# Patient Record
Sex: FEMALE | Race: OTHER | NOT HISPANIC OR LATINO | URBAN - METROPOLITAN AREA
[De-identification: names, ages, dates, MRNs, and addresses within clinical notes are randomized per-mention and may not be internally consistent; named-entity substitution may affect disease eponyms.]

---
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
## 2025-05-22 ENCOUNTER — APPOINTMENT (OUTPATIENT)
Dept: URBAN - METROPOLITAN AREA CLINIC 77 | Facility: CLINIC | Age: 20
Setting detail: DERMATOLOGY
End: 2025-05-22

## 2025-05-22 DIAGNOSIS — L70.0 ACNE VULGARIS: ICD-10-CM

## 2025-05-22 DIAGNOSIS — D22 MELANOCYTIC NEVI: ICD-10-CM

## 2025-05-22 DIAGNOSIS — L57.8 OTHER SKIN CHANGES DUE TO CHRONIC EXPOSURE TO NONIONIZING RADIATION: ICD-10-CM

## 2025-05-22 PROBLEM — D22.39 MELANOCYTIC NEVI OF OTHER PARTS OF FACE: Status: ACTIVE | Noted: 2025-05-22

## 2025-05-22 PROBLEM — D22.5 MELANOCYTIC NEVI OF TRUNK: Status: ACTIVE | Noted: 2025-05-22

## 2025-05-22 PROBLEM — D22.71 MELANOCYTIC NEVI OF RIGHT LOWER LIMB, INCLUDING HIP: Status: ACTIVE | Noted: 2025-05-22

## 2025-05-22 PROBLEM — D22.62 MELANOCYTIC NEVI OF LEFT UPPER LIMB, INCLUDING SHOULDER: Status: ACTIVE | Noted: 2025-05-22

## 2025-05-22 PROBLEM — D22.72 MELANOCYTIC NEVI OF LEFT LOWER LIMB, INCLUDING HIP: Status: ACTIVE | Noted: 2025-05-22

## 2025-05-22 PROBLEM — D22.61 MELANOCYTIC NEVI OF RIGHT UPPER LIMB, INCLUDING SHOULDER: Status: ACTIVE | Noted: 2025-05-22

## 2025-05-22 PROCEDURE — ? PRESCRIPTION MEDICATION MANAGEMENT

## 2025-05-22 PROCEDURE — ? COUNSELING

## 2025-05-22 PROCEDURE — 99213 OFFICE O/P EST LOW 20 MIN: CPT

## 2025-05-22 PROCEDURE — ? CHRONOLOGY OF PRESENT ILLNESS

## 2025-05-22 ASSESSMENT — LOCATION DETAILED DESCRIPTION DERM
LOCATION DETAILED: RIGHT VENTRAL PROXIMAL FOREARM
LOCATION DETAILED: LEFT SUPERIOR CENTRAL MALAR CHEEK
LOCATION DETAILED: RIGHT ANTERIOR DISTAL THIGH
LOCATION DETAILED: PERIUMBILICAL SKIN
LOCATION DETAILED: EPIGASTRIC SKIN
LOCATION DETAILED: LEFT MEDIAL SUPERIOR CHEST
LOCATION DETAILED: LEFT PROXIMAL PRETIBIAL REGION
LOCATION DETAILED: LEFT VENTRAL PROXIMAL FOREARM
LOCATION DETAILED: RIGHT CENTRAL MALAR CHEEK
LOCATION DETAILED: INFERIOR MID FOREHEAD
LOCATION DETAILED: LEFT ANTERIOR DISTAL THIGH
LOCATION DETAILED: STERNAL NOTCH
LOCATION DETAILED: RIGHT PROXIMAL PRETIBIAL REGION
LOCATION DETAILED: RIGHT ANTERIOR PROXIMAL UPPER ARM
LOCATION DETAILED: LEFT INFERIOR CENTRAL MALAR CHEEK
LOCATION DETAILED: LEFT ANTERIOR DISTAL UPPER ARM

## 2025-05-22 ASSESSMENT — LOCATION SIMPLE DESCRIPTION DERM
LOCATION SIMPLE: RIGHT FOREARM
LOCATION SIMPLE: LEFT UPPER ARM
LOCATION SIMPLE: LEFT CHEEK
LOCATION SIMPLE: RIGHT PRETIBIAL REGION
LOCATION SIMPLE: LEFT FOREARM
LOCATION SIMPLE: CHEST
LOCATION SIMPLE: INFERIOR FOREHEAD
LOCATION SIMPLE: RIGHT THIGH
LOCATION SIMPLE: RIGHT CHEEK
LOCATION SIMPLE: RIGHT UPPER ARM
LOCATION SIMPLE: LEFT THIGH
LOCATION SIMPLE: LEFT PRETIBIAL REGION
LOCATION SIMPLE: ABDOMEN

## 2025-05-22 ASSESSMENT — LOCATION ZONE DERM
LOCATION ZONE: ARM
LOCATION ZONE: TRUNK
LOCATION ZONE: LEG
LOCATION ZONE: FACE

## 2025-05-22 NOTE — PROCEDURE: CHRONOLOGY OF PRESENT ILLNESS
Detail Level: Zone
Chronology Of Present Illness: 11/4/22\\nPatient reports acne present for two years. She has been using Clindamycin lotion QAM and Retin A micro 0.06% QPM. She has been on current OCP for 5 months. She states has tried Dapsone 7.5%  and other topicals in the past. Discussed changing topical medications up a little with Clindamycin/BPO QAM and a lower dose of retinoid at night. Can also consider oral medication with antibiotics or Isotretinoin. Patient to start with Clindamycin/BPO QAM and Tretinoin 0.025% QPM, start two nights per week and gradually increase to nightly as tolerated. Still flaring\\n\\n1/4/23\\nPatient presents improved on todays exam, reporting minimal texture compared to before. Patient has been using Tretinoin 0.025% nearly every night, with clinda BPO in the morning. Photos taken on todays visit. Patient is still breaking out and scars easily. Discussed increasing tretinoin strength to 0.05%. Discussed oral options as well, including oral antibiotic and accutane. Patient?s mother wishes to avoid oral medications for the time being. Patient to start increased strength of tretinoin and continue use of clinda BPO.\\n\\n04/04/23\\nPt condition has improved significantly since last visit. Advised pt to continue using clinda BPO and especially tretinoin to continue helping the skin stimulate collagen. Advised pt it will take about a years time for the scarring to fade. Also discussed micro-needling treatments if pt is unsatisfied with texture and scarring after a year has passed. Advised pt to continue regimen and follow up if condition changes. Will provide refills and follow up in 1 year.\\n\\n5/7/24\\nPatient is well controlled with current topicals. Notes some resolving inflammatory papules. Patient is currently on menstrual period and notes eating inflammatory foods recently. Plan to continue with current topicals. Advised of possible issues with benzene in benzoyl peroxide - discussed that this not been proven to be in all products and, if patient chooses to continue using, recommended refrigerating. Discussed otc hydrocortisone 1% and pimple patches as spot treatment. Photos taken today. Refills sent. Will f/u in 1 year.\\n\\n5/22/15\\nPatient states her acne is stable. She reports having some breakouts due to sweating. Patient states she is happy with her current topical regimen of Tretinoin 0.05% QHS, and clindamycin/BPO QAM. Patient will spot treat midday with Clindamycin/BPO if she needs after sweating and washing face.

## 2025-07-08 RX ORDER — CLINDAMYCIN PHOSPHATE AND BENZOYL PEROXIDE 1 %-5 %
PEA SIZE KIT TOPICAL QAM
Qty: 50 | Refills: 6 | Status: ERX